# Patient Record
Sex: MALE | Race: WHITE | URBAN - METROPOLITAN AREA
[De-identification: names, ages, dates, MRNs, and addresses within clinical notes are randomized per-mention and may not be internally consistent; named-entity substitution may affect disease eponyms.]

---

## 2021-01-28 ENCOUNTER — OFFICE VISIT (OUTPATIENT)
Dept: URBAN - METROPOLITAN AREA CLINIC 41 | Facility: CLINIC | Age: 86
End: 2021-01-28
Payer: MEDICARE

## 2021-01-28 DIAGNOSIS — H35.371 PUCKERING OF MACULA, RIGHT EYE: Primary | ICD-10-CM

## 2021-01-28 DIAGNOSIS — H04.123 DRY EYE SYNDROME OF BILATERAL LACRIMAL GLANDS: ICD-10-CM

## 2021-01-28 PROCEDURE — 92134 CPTRZ OPH DX IMG PST SGM RTA: CPT | Performed by: OPHTHALMOLOGY

## 2021-01-28 PROCEDURE — 99204 OFFICE O/P NEW MOD 45 MIN: CPT | Performed by: OPHTHALMOLOGY

## 2021-01-28 ASSESSMENT — INTRAOCULAR PRESSURE
OD: 8
OS: 8

## 2021-01-28 NOTE — IMPRESSION/PLAN
Impression: ERM, OD. Plan: The patient notes blurry VA OD since 2012. Exam and OCT reveal an epiretinal membrane (ERM OD (538 microns). Discussed options of observation vs surgery. The patient will consider his options. Thanks, Eric Flores. 

Return in 1 month for follow up, OCT OU, IVFA OD 1st

## 2021-06-03 ENCOUNTER — OFFICE VISIT (OUTPATIENT)
Dept: URBAN - METROPOLITAN AREA CLINIC 41 | Facility: CLINIC | Age: 86
End: 2021-06-03
Payer: MEDICARE

## 2021-06-03 DIAGNOSIS — Z96.1 PRESENCE OF INTRAOCULAR LENS: ICD-10-CM

## 2021-06-03 DIAGNOSIS — H43.813 VITREOUS DEGENERATION, BILATERAL: ICD-10-CM

## 2021-06-03 PROCEDURE — 92235 FLUORESCEIN ANGRPH MLTIFRAME: CPT | Performed by: OPHTHALMOLOGY

## 2021-06-03 PROCEDURE — 99214 OFFICE O/P EST MOD 30 MIN: CPT | Performed by: OPHTHALMOLOGY

## 2021-06-03 PROCEDURE — 92134 CPTRZ OPH DX IMG PST SGM RTA: CPT | Performed by: OPHTHALMOLOGY

## 2021-06-03 ASSESSMENT — INTRAOCULAR PRESSURE
OD: 13
OS: 14

## 2021-06-03 NOTE — IMPRESSION/PLAN
Impression: ERM, OD. Plan: The patient notes blurry VA OD since 2012. Exam and OCT reveal an epiretinal membrane ERM OD (535 microns). An IVFA from 06/03/2021 demonstrated no telangiectasia. Fundus Photos from 06/03/2021 demonstrated no IRH. Discussed options of observation vs surgery. The patient is having difficulty reading. RBA discussed. The patient elects surgery. Thanks, Evelina Romano. Plan: 
1) Cardiology clearance 2) 25 g PPV / MP ILM / ICG / PRP OD